# Patient Record
Sex: MALE | Race: WHITE | Employment: UNEMPLOYED | URBAN - METROPOLITAN AREA
[De-identification: names, ages, dates, MRNs, and addresses within clinical notes are randomized per-mention and may not be internally consistent; named-entity substitution may affect disease eponyms.]

---

## 2018-04-26 ENCOUNTER — HOSPITAL ENCOUNTER (INPATIENT)
Age: 68
LOS: 1 days | Discharge: HOME | DRG: 897 | End: 2018-04-27
Attending: EMERGENCY MEDICINE | Admitting: PSYCHIATRY & NEUROLOGY
Payer: OTHER GOVERNMENT

## 2018-04-26 DIAGNOSIS — R45.851 SUICIDAL IDEATION: Primary | ICD-10-CM

## 2018-04-26 PROBLEM — F19.94 SUBSTANCE INDUCED MOOD DISORDER (HCC): Status: ACTIVE | Noted: 2018-04-26

## 2018-04-26 LAB
ALBUMIN SERPL-MCNC: 4.2 G/DL (ref 3.5–5)
ALBUMIN/GLOB SERPL: 1.1 {RATIO} (ref 1.1–2.2)
ALP SERPL-CCNC: 90 U/L (ref 45–117)
ALT SERPL-CCNC: 28 U/L (ref 12–78)
AMPHET UR QL SCN: NEGATIVE
ANION GAP SERPL CALC-SCNC: 9 MMOL/L (ref 5–15)
APAP SERPL-MCNC: <2 UG/ML (ref 10–30)
APPEARANCE UR: CLEAR
AST SERPL-CCNC: 20 U/L (ref 15–37)
BACTERIA URNS QL MICRO: NEGATIVE /HPF
BARBITURATES UR QL SCN: NEGATIVE
BASOPHILS # BLD: 0 K/UL (ref 0–0.1)
BASOPHILS NFR BLD: 1 % (ref 0–1)
BENZODIAZ UR QL: NEGATIVE
BILIRUB SERPL-MCNC: 0.5 MG/DL (ref 0.2–1)
BILIRUB UR QL: NEGATIVE
BUN SERPL-MCNC: 12 MG/DL (ref 6–20)
BUN/CREAT SERPL: 12 (ref 12–20)
CALCIUM SERPL-MCNC: 9.5 MG/DL (ref 8.5–10.1)
CANNABINOIDS UR QL SCN: POSITIVE
CHLORIDE SERPL-SCNC: 108 MMOL/L (ref 97–108)
CO2 SERPL-SCNC: 23 MMOL/L (ref 21–32)
COCAINE UR QL SCN: NEGATIVE
COLOR UR: NORMAL
CREAT SERPL-MCNC: 0.99 MG/DL (ref 0.7–1.3)
DIFFERENTIAL METHOD BLD: ABNORMAL
DRUG SCRN COMMENT,DRGCM: ABNORMAL
EOSINOPHIL # BLD: 0.1 K/UL (ref 0–0.4)
EOSINOPHIL NFR BLD: 1 % (ref 0–7)
EPITH CASTS URNS QL MICRO: NORMAL /LPF
ERYTHROCYTE [DISTWIDTH] IN BLOOD BY AUTOMATED COUNT: 12.9 % (ref 11.5–14.5)
ETHANOL SERPL-MCNC: <10 MG/DL
GLOBULIN SER CALC-MCNC: 4 G/DL (ref 2–4)
GLUCOSE SERPL-MCNC: 100 MG/DL (ref 65–100)
GLUCOSE UR STRIP.AUTO-MCNC: NEGATIVE MG/DL
HCT VFR BLD AUTO: 44 % (ref 36.6–50.3)
HGB BLD-MCNC: 14.7 G/DL (ref 12.1–17)
HGB UR QL STRIP: NEGATIVE
IMM GRANULOCYTES # BLD: 0 K/UL (ref 0–0.04)
IMM GRANULOCYTES NFR BLD AUTO: 0 % (ref 0–0.5)
KETONES UR QL STRIP.AUTO: NEGATIVE MG/DL
LEUKOCYTE ESTERASE UR QL STRIP.AUTO: NEGATIVE
LYMPHOCYTES # BLD: 0.9 K/UL (ref 0.8–3.5)
LYMPHOCYTES NFR BLD: 14 % (ref 12–49)
MCH RBC QN AUTO: 33.6 PG (ref 26–34)
MCHC RBC AUTO-ENTMCNC: 33.4 G/DL (ref 30–36.5)
MCV RBC AUTO: 100.5 FL (ref 80–99)
METHADONE UR QL: NEGATIVE
MONOCYTES # BLD: 0.9 K/UL (ref 0–1)
MONOCYTES NFR BLD: 13 % (ref 5–13)
NEUTS SEG # BLD: 4.7 K/UL (ref 1.8–8)
NEUTS SEG NFR BLD: 71 % (ref 32–75)
NITRITE UR QL STRIP.AUTO: NEGATIVE
NRBC # BLD: 0 K/UL (ref 0–0.01)
NRBC BLD-RTO: 0 PER 100 WBC
OPIATES UR QL: NEGATIVE
PCP UR QL: NEGATIVE
PH UR STRIP: 6 [PH] (ref 5–8)
PLATELET # BLD AUTO: 242 K/UL (ref 150–400)
PMV BLD AUTO: 10.8 FL (ref 8.9–12.9)
POTASSIUM SERPL-SCNC: 3.8 MMOL/L (ref 3.5–5.1)
PROT SERPL-MCNC: 8.2 G/DL (ref 6.4–8.2)
PROT UR STRIP-MCNC: NEGATIVE MG/DL
RBC # BLD AUTO: 4.38 M/UL (ref 4.1–5.7)
RBC #/AREA URNS HPF: NORMAL /HPF (ref 0–5)
SALICYLATES SERPL-MCNC: 2.9 MG/DL (ref 2.8–20)
SODIUM SERPL-SCNC: 140 MMOL/L (ref 136–145)
SP GR UR REFRACTOMETRY: 1.01 (ref 1–1.03)
UR CULT HOLD, URHOLD: NORMAL
UROBILINOGEN UR QL STRIP.AUTO: 1 EU/DL (ref 0.2–1)
WBC # BLD AUTO: 6.6 K/UL (ref 4.1–11.1)
WBC URNS QL MICRO: NORMAL /HPF (ref 0–4)

## 2018-04-26 PROCEDURE — 36415 COLL VENOUS BLD VENIPUNCTURE: CPT | Performed by: EMERGENCY MEDICINE

## 2018-04-26 PROCEDURE — 80307 DRUG TEST PRSMV CHEM ANLYZR: CPT | Performed by: EMERGENCY MEDICINE

## 2018-04-26 PROCEDURE — 85025 COMPLETE CBC W/AUTO DIFF WBC: CPT | Performed by: EMERGENCY MEDICINE

## 2018-04-26 PROCEDURE — 65220000003 HC RM SEMIPRIVATE PSYCH

## 2018-04-26 PROCEDURE — 81001 URINALYSIS AUTO W/SCOPE: CPT | Performed by: EMERGENCY MEDICINE

## 2018-04-26 PROCEDURE — 99285 EMERGENCY DEPT VISIT HI MDM: CPT

## 2018-04-26 PROCEDURE — 74011250637 HC RX REV CODE- 250/637: Performed by: PSYCHIATRY & NEUROLOGY

## 2018-04-26 PROCEDURE — 80053 COMPREHEN METABOLIC PANEL: CPT | Performed by: EMERGENCY MEDICINE

## 2018-04-26 PROCEDURE — 90791 PSYCH DIAGNOSTIC EVALUATION: CPT

## 2018-04-26 PROCEDURE — 84443 ASSAY THYROID STIM HORMONE: CPT | Performed by: PSYCHIATRY & NEUROLOGY

## 2018-04-26 RX ORDER — BENZTROPINE MESYLATE 1 MG/ML
1 INJECTION INTRAMUSCULAR; INTRAVENOUS
Status: DISCONTINUED | OUTPATIENT
Start: 2018-04-26 | End: 2018-04-27 | Stop reason: HOSPADM

## 2018-04-26 RX ORDER — OLANZAPINE 2.5 MG/1
2.5 TABLET ORAL
Status: DISCONTINUED | OUTPATIENT
Start: 2018-04-26 | End: 2018-04-27 | Stop reason: HOSPADM

## 2018-04-26 RX ORDER — IBUPROFEN 400 MG/1
400 TABLET ORAL
Status: DISCONTINUED | OUTPATIENT
Start: 2018-04-26 | End: 2018-04-27 | Stop reason: HOSPADM

## 2018-04-26 RX ORDER — ADHESIVE BANDAGE
30 BANDAGE TOPICAL DAILY PRN
Status: DISCONTINUED | OUTPATIENT
Start: 2018-04-26 | End: 2018-04-27 | Stop reason: HOSPADM

## 2018-04-26 RX ORDER — ACETAMINOPHEN 325 MG/1
650 TABLET ORAL
Status: DISCONTINUED | OUTPATIENT
Start: 2018-04-26 | End: 2018-04-27 | Stop reason: HOSPADM

## 2018-04-26 RX ORDER — BENZTROPINE MESYLATE 1 MG/1
1 TABLET ORAL
Status: DISCONTINUED | OUTPATIENT
Start: 2018-04-26 | End: 2018-04-27 | Stop reason: HOSPADM

## 2018-04-26 RX ORDER — IBUPROFEN 200 MG
1 TABLET ORAL
Status: DISCONTINUED | OUTPATIENT
Start: 2018-04-26 | End: 2018-04-27 | Stop reason: HOSPADM

## 2018-04-26 RX ORDER — ZOLPIDEM TARTRATE 5 MG/1
5 TABLET ORAL
Status: DISCONTINUED | OUTPATIENT
Start: 2018-04-26 | End: 2018-04-27 | Stop reason: HOSPADM

## 2018-04-26 RX ADMIN — ZOLPIDEM TARTRATE 5 MG: 5 TABLET ORAL at 21:49

## 2018-04-26 NOTE — PROGRESS NOTES
Admission Medication Reconciliation:    Information obtained from: patient     Significant PMH/Disease States:   Past Medical History:   Diagnosis Date    Aggressive outburst     Liver disease     Psychiatric disorder     ptsd    Substance abuse     Withdrawal syndrome ([facility] [address]        Chief Complaint for this Admission:  behavioral health evaluation     Allergies:  Review of patient's allergies indicates no known allergies. Prior to Admission Medications:   None         Comments/Recommendations: PTA medication list was last reviewed in 2013. Patient denies regularly taking any prescription or non-prescription medications prior to admission. I removed: chlorhexidine liquid, clonazepam, divalproex ER, fluoxetine, MVI, olanzapine, and trazodone.

## 2018-04-26 NOTE — BSMART NOTE
Comprehensive Assessment Form Part 1      Section I - Disposition    Axis I - Depression Unspecified, Polysubstance Use Disorder (THC, cocaine, marijuana, heroin)   Axis II - Deferred  Axis III -   Past Medical History:   Diagnosis Date    Aggressive outburst     Liver disease     Psychiatric disorder     ptsd    Substance abuse     Withdrawal syndrome ([facility] [address]        Axis IV - Loss of significant other to suicide  [place] V - 28      The Medical Doctor to Psychiatrist conference was not completed. The Medical Doctor is in agreement with Psychiatrist disposition because of (reason) Admission is recommended. The plan is admit to general unit at [facility]. The on-call Psychiatrist consulted was Dr. Renny Lakhani. The admitting Psychiatrist will be Dr. Harry Bullock. The admitting Diagnosis is Depression and Substance Abuse. The Payor source is [place]. Section II - Integrated Summary  Summary:  Patient came in by squad due to suicidal ideation with a plan to obtain and use a large quantity of heroin to overdose. Patient reported he doesn't have it but can get the heroin. Patient indicated this is over a former girlfriend who committed suicide \"over me\". Patient currently goes to the [place] for services and sees a Dr Christopher Mcneill (sp?)  Patient reported he is on Prozac and stated its not working and he also stated he doesn't take it when he is drinking. Patient has been admitted previously for psychiatric and substance abuse treatment and most recently was at the [place] 4 months ago. Patient was also admitted here briefly in 2013 for a day and asked to leave. Patient is alert and oriented. He is somewhat guarded but answered most questions. Patient reported depression, loss of weight and appetite, poor sleep and SI to overdose. He reported he attempted suicide a couple of months ago but doesn't recall how. Patient reported he doesn't know how often he drinks and cannot recall the last time.   He reported use of IV heroin, cocaine, and THC a couple of days ago. Patient agrees to admission if it is recommended. He does not feel safe to discharge. Later patient stated he wasn't suicidal and made it up because he used all of his money. Spoke with ER Doctor Nithya De Souza and Dr Melissa Aragon, Psychiatry who still recommend admission as patient has no support system to safety plan with and has access to variety of substances. Discussed with patient and he agreed but was advised that if he continues to change his mind back and forth we definitely would call [facility] for assessment. Also advised him that voluntary admission means agreeing to accept treatment recommendations. Patient verbalized understanding. The patienthas demonstrated mental capacity to provide informed consent. The information is given by the patient and past medical records. The Chief Complaint is SI with plan to overdose. The Precipitant Factors are death by suicide of girlfriend in 2009. Previous Hospitalizations: Yes  Current Psychiatrist and/or  is [facility].    Lethality Assessment:    The potential for suicide noted by the following: defined plan, ideation and current substance abuse . The potential for homicide is not noted. The patient has not been a perpetrator of sexual or physical abuse. There are not pending charges. The patient is felt to be at risk for self harm or harm to others. The attending nurse was advised that security has not been notified. Section III - Psychosocial  The patient's overall mood and attitude is depressed. Feelings of helplessness and hopelessness are observed by verbal statements. Generalized anxiety is not observed. Panic is not observed. Phobias are not observed. Obsessive compulsive tendencies are not observed. Section IV - Mental Status Exam  The patient's appearance shows no evidence of impairment. The patient's behavior shows no evidence of impairment. The patient is oriented to time, place, person and situation.   The patient's speech shows no evidence of impairment. The patient's mood is depressed. The range of affect is constricted. The patient's thought content demonstrates no evidence of impairment. The thought process shows no evidence of impairment. The patient's perception shows no evidence of impairment. The patient's memory shows no evidence of impairment. The patient's appetite is decreased and shows signs of weight loss. The patient's sleep has evidence of insomnia. The patient shows no insight. The patient's judgement is psychologically impaired. Section V - Substance Abuse  The patient is using substances. The patient is using alcohol  with last use on unknown, cannabis by inhalation for   with last use on a few days ago, cocaine  with last use on a few days ago and heroin IV   with last use on a few days ago. The patient has experienced the following withdrawal symptoms: N/A. Section VI - Living Arrangements  The patient is single. The patient lives alone. The patient has one child he has no contact with. The patient does plan to return home upon discharge. The patient does not have legal issues pending. The patient's source of income comes from disability. Yazdanism and cultural practices have not been voiced at this time. The patient's greatest support comes from none and this person will not be involved with the treatment. The patient has not been in an event described as horrible or outside the realm of ordinary life experience either currently or in the past.  The patient has not been a victim of sexual/physical abuse. Section VII - Other Areas of Clinical Concern  The highest grade achieved is NA with the overall quality of school experience being described as NA. The patient is currently disabled and speaks [place] as a primary language. The patient has no communication impairments affecting communication.  The patient's preference for learning can be described as: can read and write adequately. The patient's hearing is normal.  The patient's vision is impaired and  wears glasses or contacts.       Maciel Baez, RUFINA

## 2018-04-26 NOTE — IP AVS SNAPSHOT
[address] [phone] Patient: Ron Alvares MRN: [id number] OSQ:87/43/0629 A check emiliano indicates which time of day the medication should be taken. My Medications Notice You have not been prescribed any medications.

## 2018-04-26 NOTE — IP AVS SNAPSHOT
[address] 
[phone] Patient: Desiree Workman MRN: [id number] URC: About your hospitalization You were admitted on:  2018 You last received care in the:  [address] You were discharged on:  2018 Why you were hospitalized Your primary diagnosis was:  Substance Induced Mood Disorder (Hcc) Follow-up Information Follow up With Details Comments Contact Info Provider Unknown   Patient not available to ask Dr Leah Sagastume  On 2018 Time of Your Appt: 11:30 am Lissa Bateman [facility] [address] [facility] [address] 
[phone] Discharge Orders None A check emiliano indicates which time of day the medication should be taken. My Medications Notice You have not been prescribed any medications. Discharge Instructions DISCHARGE SUMMARY 
 
[address] RODRIGUEZB: 1950 MRN: [id number] The patient Desiree Workman exhibits the ability to control behavior in a less restrictive environment. Patient's level of functioning is improving. No assaultive/destructive behavior has been observed for the past 24 hours. No suicidal/homicidal threat or behavior has been observed for the past 24 hours. There is no evidence of serious medication side effects. Patient has not been in physical or protective restraints for at least the past 24 hours. If weapons involved, how are they secured? n/a Is patient aware of and in agreement with discharge plan? yes Arrangements for medication:  Prescriptions were not given  to patient. Referral for substance abuse treatment ? Yes, [facility] Referral for smoking cessation needed ? no 
 
Copy of discharge instructions to  provider?:  Fax: [phone] Arrangements for transportation home:  Hospital provided transportation via [facility] Keep all follow up appointments as scheduled, continue to take prescribed medications per physician instructions. Mental health crisis number:  730 or your local mental health crisis line number at [phone] DISCHARGE SUMMARY from Nurse PATIENT INSTRUCTIONS: 
 
What to do at Home: 
Recommended activity: Activity as tolerated, If you experience any of the following symptoms thoughts of harming self, feeling overwhelmed with grief, please follow up with assigned providers and local crisis number. *  Please give a list of your current medications to your Primary Care Provider. *  Please update this list whenever your medications are discontinued, doses are 
    changed, or new medications (including over-the-counter products) are added. *  Please carry medication information at all times in case of emergency situations. These are general instructions for a healthy lifestyle: No smoking/ No tobacco products/ Avoid exposure to second hand smoke Surgeon General's Warning:  Quitting smoking now greatly reduces serious risk to your health. Obesity, smoking, and sedentary lifestyle greatly increases your risk for illness A healthy diet, regular physical exercise & weight monitoring are important for maintaining a healthy lifestyle You may be retaining fluid if you have a history of heart failure or if you experience any of the following symptoms:  Weight gain of 3 pounds or more overnight or 5 pounds in a week, increased swelling in our hands or feet or shortness of breath while lying flat in bed. Please call your doctor as soon as you notice any of these symptoms; do not wait until your next office visit. Recognize signs and symptoms of STROKE: 
 
F-face looks uneven A-arms unable to move or move unevenly S-speech slurred or non-existent T-time-call 911 as soon as signs and symptoms begin-DO NOT go  
 Back to bed or wait to see if you get better-TIME IS BRAIN. Warning Signs of HEART ATTACK Call 911 if you have these symptoms: 
? Chest discomfort. Most heart attacks involve discomfort in the center of the chest that lasts more than a few minutes, or that goes away and comes back. It can feel like uncomfortable pressure, squeezing, fullness, or pain. ? Discomfort in other areas of the upper body. Symptoms can include pain or discomfort in one or both arms, the back, neck, jaw, or stomach. ? Shortness of breath with or without chest discomfort. ? Other signs may include breaking out in a cold sweat, nausea, or lightheadedness. Don't wait more than five minutes to call [address]! Fast action can save your life. Calling 911 is almost always the fastest way to get lifesaving treatment. Emergency Medical Services staff can begin treatment when they arrive  up to an hour sooner than if someone gets to the hospital by car. The discharge information has been reviewed with the patient. The patient verbalized understanding. Discharge medications reviewed with the patient and appropriate educational materials and side effects teaching were provided. ___________________________________________________________________________________________________________________________________ Ecovision Announcement We are excited to announce that we are making your provider's discharge notes available to you in Ecovision. You will see these notes when they are completed and signed by the physician that discharged you from your recent hospital stay. If you have any questions or concerns about any information you see in [employer], please call the Health Information Department where you were seen or reach out to your Primary Care Provider for more information about your plan of care. Introducing [facility]!    
 [facility] introduces Ecovision patient portal. Now you can access parts of your medical record, email your doctor's office, and request medication refills online. 1. In your internet browser, go to [email or web address] "[email or web address] 2. Click on the First Time User? Click Here link in the Sign In box. You will see the New Member Sign Up page. 3. Enter your Breather Access Code exactly as it appears below. You will not need to use this code after youve completed the sign-up process. If you do not sign up before the expiration date, you must request a new code. · Breather Access Code: [email or web address] Expires: 7/25/2018  9:45 AM 
 
4. Enter the last four digits of your Social Security Number (xxxx) and Date of Birth (mm/dd/yyyy) as indicated and click Submit. You will be taken to the next sign-up page. 5. Create a Breather ID. This will be your Breather login ID and cannot be changed, so think of one that is secure and easy to remember. 6. Create a Breather password. You can change your password at any time. 7. Enter your Password Reset Question and Answer. This can be used at a later time if you forget your password. 8. Enter your e-mail address. You will receive e-mail notification when new information is available in [address] 9. Click Sign Up. You can now view and download portions of your medical record. 10. Click the Download Summary menu link to download a portable copy of your medical information. If you have questions, please visit the Frequently Asked Questions section of the Breather website. Remember, Breather is NOT to be used for urgent needs. For medical emergencies, dial 911. Now available from your iPhone and Android! Introducing Rahul Ferrera As a Ravindra Peña patient, I wanted to make you aware of our electronic visit tool called Rahul Ferrera. Ravindra Pompa 24/7 allows you to connect within minutes with a medical provider 24 hours a day, seven days a week via a mobile device or tablet or logging into a secure website from your computer. You can access [employer] from anywhere in the [place]. A virtual visit might be right for you when you have a simple condition and feel like you just dont want to get out of bed, or cant get away from work for an appointment, when your regular [employer] provider is not available (evenings, weekends or holidays), or when youre out of town and need minor care. Electronic visits cost only $49 and if the [employer] 24/7 provider determines a prescription is needed to treat your condition, one can be electronically transmitted to a nearby pharmacy*. Please take a moment to enroll today if you have not already done so. The enrollment process is free and takes just a few minutes. To enroll, please download the [employer] 24/7 maddy to your tablet or phone, or visit [email or web address] to enroll on your computer. And, as an [address] [place] [address] patient with a [email or web address] account, the results of your visits will be scanned into your electronic medical record and your primary care provider will be able to view the scanned results. We urge you to continue to see your regular [employer] provider for your ongoing medical care. And while your primary care provider may not be the one available when you seek a CanWeNetwork virtual visit, the peace of mind you get from getting a real diagnosis real time can be priceless. For more information on CanWeNetwork, view our Frequently Asked Questions (FAQs) at [email or web address]. Sincerely, 
 
Kathryn Meredith MD 
Chief Medical Officer [facility] Lisa Guerrero *:  certain medications cannot be prescribed via CanWeNetwork Providers Seen During Your Hospitalization Provider Specialty Primary office phone Jailyn Leon MD Emergency Medicine [phone] Gabrielle Campos MD Psychiatry [phone] Anibal Brantley MD Psychiatry [phone] Your Primary Care Physician (PCP) Primary Care Physician Office Phone Office Fax UNKNOWN, PROVIDER ** None ** ** None ** You are allergic to the following No active allergies Recent Documentation Height Weight BMI Smoking Status 1.803 m 72.6 kg 22.32 kg/m2 Former Smoker Emergency Contacts Name Discharge Info Relation Home Work Mobile Morales,Charlotte DISCHARGE CAREGIVER [3] Other Relative [6] [phone] Patient Belongings The following personal items are in your possession at time of discharge: 
  Dental Appliances: None  Visual Aid: None      Home Medications: None   Jewelry: Watch (with pt)  Clothing: Belt, Footwear, Pants, Shirt    Other Valuables: Cigarettes, Personal toiletries (cigarettes,comb)  Personal Items Sent to Safe: None Please provide this summary of care documentation to your next provider. Signatures-by signing, you are acknowledging that this After Visit Summary has been reviewed with you and you have received a copy. Patient Signature:  ____________________________________________________________ Date:  ____________________________________________________________  
  
Penny Finger Provider Signature:  ____________________________________________________________ Date:  ____________________________________________________________

## 2018-04-26 NOTE — ROUTINE PROCESS
TRANSFER - OUT REPORT:    Verbal report given to Dominic Workman RN(name) on Beauty Dia  being transferred to 727(unit) for routine progression of care       Report consisted of patients Situation, Background, Assessment and   Recommendations(SBAR). Information from the following report(s) SBAR, ED Summary and Recent Results was reviewed with the receiving nurse. Lines:       Opportunity for questions and clarification was provided.       Patient transported with:  [employer]

## 2018-04-26 NOTE — BH NOTES
At 1435 patient arrived to the unit. Primary Nurse Tristin Ford RN and Liz Pizano RN performed a dual skin assessment on this patient No impairment noted  David score is 23    Oriented to the unit. Denies SI. States is depressed. States does not drink alcohol. States smokes THC yesterday. States is [place]. States is sad over a friend who \"comitted suicide because of me\". Patient mood is labile. Patient stated he lives in apartment and also said that he was wanting a place to stay until his check came in but that he was never really suicidal. Pt does agree to voluntarily stay and would like to seek help for his depression. Patient states I do not want any meds\" NKA per pt           At [phone] paged Dr. Jose Alfredo Contreras for orders.

## 2018-04-26 NOTE — ED PROVIDER NOTES
HPI Comments: 79 y.o. male with past medical history significant for withdrawal syndrome, substance abuse, liver disease, PTSD, and aggressive outburst who presents from home via EMS with chief complaint of SI. Pt reports that he has had psychiatric issues since his girlfriend committed suicide \"over him\" 20+ years ago \"in the 80's. \" Pt reports monthly thoughts of SI with the plan to OD on heroin. Pt reports access to the drug and that he last used \"a couple of weeks ago. \" Pt states that he is followed by a psychiatrist and was last seen \"a couple months ago. \" Pt endorses that he was last admitted for pyschiatric care ~ 4 months ago to the [facility] where he endorses staying for [place] couple weeks. \" Pt denies being on any current medications. He states that he has never had any DT's or seizures when in withdrawal from alcohol. There are no other acute medical concerns at this time. Social hx: 1 pack / day smoker. Pt reports that he is an \"alcoholic\" with last drink ~ 1 week ago. Heroin use. Note written by Violetta García, as dictated by Alisha Leon MD 9:53 AM      The history is provided by the patient. No  was used. Past Medical History:   Diagnosis Date    Aggressive outburst     Liver disease     Psychiatric disorder     ptsd    Substance abuse     Withdrawal syndrome ([facility] [address]        History reviewed. No pertinent surgical history. Family History:   Problem Relation Age of Onset    Substance Abuse Father        Social History     Social History    Marital status: SINGLE     Spouse name: N/A    Number of children: N/A    Years of education: N/A     Occupational History    Not on file.      Social History Main Topics    Smoking status: Former Smoker    Smokeless tobacco: Not on file    Alcohol use 12.0 oz/week     24 Cans of beer per week    Drug use: Not on file    Sexual activity: Yes     Other Topics Concern    Not on file     Social History Narrative         ALLERGIES: Review of patient's allergies indicates no known allergies. Review of Systems   Constitutional: Negative for activity change, chills and fever. HENT: Negative for nosebleeds, sore throat, trouble swallowing and voice change. Eyes: Negative for visual disturbance. Respiratory: Negative for shortness of breath. Cardiovascular: Negative for chest pain and palpitations. Gastrointestinal: Negative for abdominal pain, constipation, diarrhea and nausea. Genitourinary: Negative for difficulty urinating, dysuria, hematuria and urgency. Musculoskeletal: Negative for back pain, neck pain and neck stiffness. Skin: Negative for color change. Allergic/Immunologic: Negative for immunocompromised state. Neurological: Negative for dizziness, seizures, syncope, weakness, light-headedness, numbness and headaches. Psychiatric/Behavioral: Positive for suicidal ideas. Negative for behavioral problems, confusion, hallucinations and self-injury. All other systems reviewed and are negative. Vitals:    04/26/18 0946   BP: 160/80   Pulse: 82   Resp: 14   Temp: 97.5 °F (36.4 °C)   SpO2: 98%   Weight: 72.6 kg (160 lb)   Height: 5' 11\" (1.803 m)            Physical Exam   Constitutional: He is oriented to person, place, and time. He appears well-developed and well-nourished. No distress. Disheveled and elderly appearance. HENT:   Head: Normocephalic and atraumatic. Eyes: Pupils are equal, round, and reactive to light. Neck: Normal range of motion. Neck supple. Cardiovascular: Normal rate, regular rhythm and normal heart sounds. Exam reveals no gallop and no friction rub. No murmur heard. Pulmonary/Chest: Effort normal and breath sounds normal. No respiratory distress. He has no wheezes. Abdominal: Soft. Bowel sounds are normal. He exhibits no distension. There is no tenderness. There is no rebound and no guarding. Musculoskeletal: Normal range of motion. Neurological: He is alert and oriented to person, place, and time. Skin: Skin is warm. No rash noted. He is not diaphoretic. Psychiatric: He has a normal mood and affect. His behavior is normal. Judgment normal. He expresses suicidal ideation. Nursing note and vitals reviewed. Note written by Violetta Randolph, as dictated by Melony Brown MD 9:55 AM      [facility]      ED Course     This is a 80-year-old male with past medical history, review of systems, physical exam as above, presenting with complaints of suicidal ideation, patient states is due to the loss of his girlfriend approximately 25 years ago. Patient denies auditory or visual hallucinations, dorsalis previous hospitalizations for suicidality, PTSD, substance abuse, tobacco use, alcohol use. Physical exam remarkable for disheveled elderly male in no acute distress. He states his method of suicide is planned to be heroin overdose, which she has access to. Plan to obtain preadmission labs, consult psychiatry for recommendations, and will be disposition based on the patient's diagnostics and response to therapy. Procedures      12:28 PM  Patient medically cleared, per psych will be admitted. 12:46 PM  Patient now denies SI, Psych recs consulting [address] [place] [address] which they are facilitating. 1:30 PM  Informed by [facility] that patient is willing to be admitted.

## 2018-04-26 NOTE — IP AVS SNAPSHOT
Summary of Care Report The Summary of Care report has been created to help improve care coordination. Users with access to [employer] or [address] (Web-based application) may access additional patient information including the Discharge Summary. If you are not currently a [address] user and need more information, please call the number listed below in the [place] [address] section and ask to be connected with Medical Records. Facility Information Name Address Phone [phone] [phone] Patient Information Patient Name Sex  Kole Rider ([id number]) Male 1950 Discharge Information Admitting Provider Service Area Unit Bree aCrcamo MD / [id number] / [phone] Discharge Provider Discharge Date/Time Discharge Disposition Destination (none) 2018 (Pending) AHR (none) Patient Language Language ENGLISH [13] Hospital Problems as of 2018  Reviewed: 2013  3:49 PM by Brianda Hayden NP Class Noted - Resolved Last Modified POA Active Problems * (Principal)Substance induced mood disorder ([address]  2018 - Present 2018 by Rosie Alexander MD Unknown Entered by Bree Carcamo MD  
  
Non-Hospital Problems as of 2018  Reviewed: 2013  3:49 PM by Brianda Hayden NP Class Noted - Resolved Last Modified Active Problems Mood disorder ([address]  2013 - Present 2013 Entered by Brianda Hayden NP Alcohol intoxication ([address]  2013 - Present 2013 Entered by Brianda Hayden NP Polysubstance dependence ([address]  2013 - Present 2013 Entered by Brianda Hayden NP You are allergic to the following No active allergies Current Discharge Medication List  
  
 Notice You have not been prescribed any medications. Follow-up Information Follow up With Details Comments Contact Info Provider Unknown   Patient not available to ask Dr Drew Nesbitt  On 2018 Time of Your Appt: 11:30 am Hodan Stringer [facility] [address] [address] 
[phone] Discharge Instructions DISCHARGE SUMMARY 
 
[address] [place]: 1950 MRN: [id number] The patient Gerald Belle exhibits the ability to control behavior in a less restrictive environment. Patient's level of functioning is improving. No assaultive/destructive behavior has been observed for the past 24 hours. No suicidal/homicidal threat or behavior has been observed for the past 24 hours. There is no evidence of serious medication side effects. Patient has not been in physical or protective restraints for at least the past 24 hours. If weapons involved, how are they secured? n/a Is patient aware of and in agreement with discharge plan? yes Arrangements for medication:  Prescriptions were not given  to patient. Ref[facility] for [facility] treatment ? [facility] Referral for smoking cessation needed ? no 
 
Copy of discharge instructions to  provider?:  Fax: [phone] Arrangements for transportation home:  Hospital provided transportation via [facility] Keep all follow up appointments as scheduled, continue to take prescribed medications per physician instructions. Mental health crisis number:  474 or your local mental health crisis line number at [phone] DISCHARGE SUMMARY from Nurse PATIENT INSTRUCTIONS: 
 
What to do at Home: 
Recommended activity: Activity as tolerated, If you experience any of the following symptoms thoughts of harming self, feeling overwhelmed with grief, please follow up with assigned providers and local crisis number. *  Please give a list of your current medications to your Primary Care Provider. *  Please update this list whenever your medications are discontinued, doses are 
    changed, or new medications (including over-the-counter products) are added. *  Please carry medication information at all times in case of emergency situations. These are general instructions for a healthy lifestyle: No smoking/ No tobacco products/ Avoid exposure to second hand smoke Surgeon General's Warning:  Quitting smoking now greatly reduces serious risk to your health. Obesity, smoking, and sedentary lifestyle greatly increases your risk for illness A healthy diet, regular physical exercise & weight monitoring are important for maintaining a healthy lifestyle You may be retaining fluid if you have a history of heart failure or if you experience any of the following symptoms:  Weight gain of 3 pounds or more overnight or 5 pounds in a week, increased swelling in our hands or feet or shortness of breath while lying flat in bed. Please call your doctor as soon as you notice any of these symptoms; do not wait until your next office visit. Recognize signs and symptoms of STROKE: 
 
F-face looks uneven A-arms unable to move or move unevenly S-speech slurred or non-existent T-time-call 911 as soon as signs and symptoms begin-DO NOT go Back to bed or wait to see if you get better-TIME IS BRAIN. Warning Signs of HEART ATTACK Call 911 if you have these symptoms: 
? Chest discomfort. Most heart attacks involve discomfort in the center of the chest that lasts more than a few minutes, or that goes away and comes back. It can feel like uncomfortable pressure, squeezing, fullness, or pain. ? Discomfort in other areas of the upper body. Symptoms can include pain or discomfort in one or both arms, the back, neck, jaw, or stomach. ? Shortness of breath with or without chest discomfort. ? Other signs may include breaking out in a cold sweat, nausea, or lightheadedness. Don't wait more than five minutes to call [address]! Fast action can save your life. Calling 911 is almost always the fastest way to get lifesaving treatment. Emergency Medical Services staff can begin treatment when they arrive  up to an hour sooner than if someone gets to the hospital by car. The discharge information has been reviewed with the patient. The patient verbalized understanding. Discharge medications reviewed with the patient and appropriate educational materials and side effects teaching were provided. ___________________________________________________________________________________________________________________________________ Chart Review Routing History No Routing History on File

## 2018-04-26 NOTE — PROGRESS NOTES
Problem: Psychosis  Goal: *STG: Remains safe in hospital  Outcome: Progressing Towards Goal  Patient is sitting in the dinning room. Alert and verbal.  Flat, sad affect. No distress noted.   Patient remains safe in hospital.

## 2018-04-26 NOTE — ED TRIAGE NOTES
Pt arrived by EMS from home. Pt complaint of SI since this morning. Pt states that he has a plan and means. Pt states that is how his girlfriend committed suicide in the 80's.

## 2018-04-27 VITALS
WEIGHT: 160 LBS | SYSTOLIC BLOOD PRESSURE: 129 MMHG | BODY MASS INDEX: 22.4 KG/M2 | RESPIRATION RATE: 16 BRPM | OXYGEN SATURATION: 98 % | HEART RATE: 74 BPM | TEMPERATURE: 98.3 F | HEIGHT: 71 IN | DIASTOLIC BLOOD PRESSURE: 87 MMHG

## 2018-04-27 LAB — TSH SERPL DL<=0.05 MIU/L-ACNC: 1.06 UIU/ML (ref 0.36–3.74)

## 2018-04-27 NOTE — BH NOTES
PSYCHOSOCIAL ASSESSMENT    Patient identifying info:  Dennys Poole is a 79 y.o., male admitted 4/26/2018  9:40 AM     Presenting problem and precipitating factors: Pt voluntarily admitted himself to the hospital due to suicidal ideation with plan to use large quantity of heroin to overdose. Pt told US that he embellished the truth in order to get admitted to the hospital. US asked why he needed to be in the hospital? Pt said\" I am depressed\" but not suicidal.  SW asked if using drugs played a part in his depression? Pt responded by saying,\" I smoke THC but I also lied about using heroin. \" Pt said I still feel sad that my girlfriend committed suicide. He claims he met her while they were patients in the hospital. According to pt, she was also mentally ill ( schizophrenic) and unstable. US asked what does he expect from this hospitalization and [facility]? [facility] said the [place] did have nay beds ( likes [facility] better) and wants to get his ds adjusted. Pt has been seeing Dr Jesús Cullen for eight years and he claims they have a good working relationship. Pt plans to move to [place] to visit girlfriend's  grave so that he can put her death behind him. US unsure if pt is just rambling  or serious about his grief. Mental status assessment:  Alert, no signs of any psychosis and capable of articulating his thoughts    Current psychiatric/substance abuse providers and contact info:  Dr Jesús Cullen @ [place] ( psych)     Previous psychiatric or substance abuse services/providers and response to treatment: [facility] SA / SI      Family history of substance abuse or mental illness:    Substance abuse history: Pt has history of poly-substance abuse: IV  heroin, alcohol, cocaine and marijuana. He is currently using heroin, thc, cocaine and alcohol. His UDS was however only positive for thc.    Social History   Substance Use Topics    Smoking status: Former Smoker    Smokeless tobacco: Never Used    Alcohol use No       History of biomedical complications associated with substance abuse:      Patient's current acceptance of treatment or motivation for change:    Family constellation:    Is significant other involved? No,      Describe support system: Pt stated he lacks a support system except [facility]    Describe living arrangements and home environment:  Pt is single, has one adult child ( no contact) and he lives alone. Health issues:   Hospital Problems  Date Reviewed: 2013          Codes Class Noted POA    Substance induced mood disorder [facility]) ICD-10-CM: F19.94  ICD-9-CM: 292.84  2018 Unknown              Trauma history: Pt told Team that he suffers guilt over his  girlfriend taking her life and he was the cause.     Legal issues:     History of  service: Yes , Christine ( [employer])     Financial status: Disability     Advent/cultural factors:  Not voiced    Education/work history:      Have you been licensed as a health care professional ( current or  ) :  No    Leisure and recreation preferences:    Describe coping skills:  Ineffective and poor judgement  Kaylin Lay  2018

## 2018-04-27 NOTE — DISCHARGE SUMMARY
PSYCHIATRIC DISCHARGE SUMMARY         IDENTIFICATION:    Patient Name  Chloé Carreon   Date of Birth 1950   [facility] [id number]   Medical Record Number  [id number]      Age  79 y.o. PCP PROVIDER UNKNOWN   Admit date:  4/26/2018    Discharge date: 4/27/2018   Room Number  727/01  @ [facility]   Date of Service  4/27/2018                 Discharge -REGULAR               CONDITION AT DISCHARGE: good       PROVISIONAL & DISCHARGE DIAGNOSES:    Problem List  Date Reviewed: 5/19/2013          Codes Class    * (Principal)Substance induced mood disorder ([facility] [address] ICD-10-CM: F19.94  ICD-9-CM: 292.84         Mood disorder ([facility] [address] ICD-10-CM: F39  ICD-9-CM: 296.90         Alcohol intoxication ([facility] [address] ICD-10-CM: F10.929  ICD-9-CM: 305.00         Polysubstance dependence ([facility] [address] ICD-10-CM: F19.20  ICD-9-CM: 304.80               Active Hospital Problems    *Substance induced mood disorder ([facility] [address]        DISCHARGE DIAGNOSIS:   Axis I:  SEE ABOVE  Axis II: SEE ABOVE  Axis III: SEE ABOVE  Axis IV:  lack of structure  Axis V:  70 on admission, 70 on discharge 70(baseline)       CC & HISTORY OF PRESENT ILLNESS:  79year old male admitted for depression and substance abuse. Pt denied SI/HI intent or plan and did not meet TDO criteria. SOCIAL HISTORY:    Social History     Social History    Marital status: SINGLE     Spouse name: N/A    Number of children: N/A    Years of education: N/A     Occupational History    Not on file. Social History Main Topics    Smoking status: Former Smoker    Smokeless tobacco: Never Used    Alcohol use No    Drug use: Yes     Special: Marijuana      Comment: a joint a week    Sexual activity: Yes     Other Topics Concern    Not on file     Social History Narrative    79year old male admitted for depression and substance abuse. Pt is followed at the [address]:   Family History   Problem Relation Age of Onset    Substance Abuse Father              HOSPITALIZATION COURSE:    Malcom Garcia was admitted to the inpatient psychiatric unit [facility] for acute psychiatric stabilization in regards to symptomatology as described in the HPI above. The differential diagnosis at time of admission included: depression While on the unit Malcom Garcia was involved in individual, group, [address] [place] [address] Avenue demonstrated a slow, but progressive improvement in overall condition. Much of patient's depression appeared to be related to situational stressors and psychological factors. Please see individual progress notes for more specific details regarding patient's hospitalization course. At time of dc, Malcom Garcia was without significant problems with depression psychosis  micha. Overall presentation at time of discharge is most consistent with the diagnosis of adjustment disorder with depressed mood. Patient with request for discharge today. There are no grounds to seek a TDO. Patient has maximized benefit to be derived from acute inpatient psychiatric treatment.   All members of the treatment team concur with each other in regards to plans for discharge today per patient's request.          LABS AND IMAGAING:    Labs Reviewed   CBC WITH AUTOMATED DIFF - Abnormal; Notable for the following:        Result Value    .5 (*)     All other components within normal limits   DRUG SCREEN, URINE - Abnormal; Notable for the following:     THC (TH-CANNABINOL) POSITIVE (*)     All other components within normal limits   ACETAMINOPHEN - Abnormal; Notable for the following:     Acetaminophen level <2 (*)     All other components within normal limits   URINE CULTURE HOLD SAMPLE   METABOLIC PANEL, COMPREHENSIVE   URINALYSIS W/MICROSCOPIC   SALICYLATE   ETHYL ALCOHOL   TSH 3RD GENERATION     Admission on 04/26/2018   Component Date Value Ref Range Status    WBC 04/26/2018 6.6  4.1 - 11.1 K/uL Final    RBC 04/26/2018 4.38  4.10 - 5.70 M/uL Final    HGB 04/26/2018 14.7  12.1 - 17.0 g/dL Final    HCT 04/26/2018 44.0  36.6 - 50.3 % Final    MCV 04/26/2018 100.5* 80.0 - 99.0 FL Final    MCH 04/26/2018 33.6  26.0 - 34.0 PG Final    MCHC 04/26/2018 33.4  30.0 - 36.5 g/dL Final    RDW 04/26/2018 12.9  11.5 - 14.5 % Final    PLATELET 49/80/8977 204  150 - 400 K/uL Final    MPV 04/26/2018 10.8  8.9 - 12.9 FL Final    NRBC 04/26/2018 0.0  0  WBC Final    ABSOLUTE NRBC 04/26/2018 0.00  0.00 - 0.01 K/uL Final    NEUTROPHILS 04/26/2018 71  32 - 75 % Final    LYMPHOCYTES 04/26/2018 14  12 - 49 % Final    MONOCYTES 04/26/2018 13  5 - 13 % Final    EOSINOPHILS 04/26/2018 1  0 - 7 % Final    BASOPHILS 04/26/2018 1  0 - 1 % Final    IMMATURE GRANULOCYTES 04/26/2018 0  0.0 - 0.5 % Final    ABS. NEUTROPHILS 04/26/2018 4.7  1.8 - 8.0 K/UL Final    ABS. LYMPHOCYTES 04/26/2018 0.9  0.8 - 3.5 K/UL Final    ABS. MONOCYTES 04/26/2018 0.9  0.0 - 1.0 K/UL Final    ABS. EOSINOPHILS 04/26/2018 0.1  0.0 - 0.4 K/UL Final    ABS. BASOPHILS 04/26/2018 0.0  0.0 - 0.1 K/UL Final    ABS. IMM. GRANS. 04/26/2018 0.0  0.00 - 0.04 K/UL Final    DF 04/26/2018 AUTOMATED    Final    Sodium 04/26/2018 140  136 - 145 mmol/L Final    Potassium 04/26/2018 3.8  3.5 - 5.1 mmol/L Final    Chloride 04/26/2018 108  97 - 108 mmol/L Final    CO2 04/26/2018 23  21 - 32 mmol/L Final    Anion gap 04/26/2018 9  5 - 15 mmol/L Final    Glucose 04/26/2018 100  65 - 100 mg/dL Final    BUN 04/26/2018 12  6 - 20 MG/DL Final    Creatinine 04/26/2018 0.99  0.70 - 1.30 MG/DL Final    BUN/Creatinine ratio 04/26/2018 12  12 - 20   Final    GFR est AA 04/26/2018 >60  >60 ml/min/1.73m2 Final    GFR est non-AA 04/26/2018 >60  >60 ml/min/1.73m2 Final    Calcium 04/26/2018 9.5  8.5 - 10.1 MG/DL Final    Bilirubin, total 04/26/2018 0.5  0.2 - 1.0 MG/DL Final    ALT (SGPT) 04/26/2018 28  12 - 78 U/L Final    AST (SGOT) 04/26/2018 20  15 - 37 U/L Final    Alk.  phosphatase 04/26/2018 90  45 - 117 U/L Final  Protein, total 04/26/2018 8.2  6.4 - 8.2 g/dL Final    Albumin 04/26/2018 4.2  3.5 - 5.0 g/dL Final    Globulin 04/26/2018 4.0  2.0 - 4.0 g/dL Final    A-G Ratio 04/26/2018 1.1  1.1 - 2.2   Final    Color 04/26/2018 YELLOW/STRAW    Final    Appearance 04/26/2018 CLEAR  CLEAR   Final    Specific gravity 04/26/2018 1.008  1.003 - 1.030   Final    pH (UA) 04/26/2018 6.0  5.0 - 8.0   Final    Protein 04/26/2018 NEGATIVE   NEG mg/dL Final    Glucose 04/26/2018 NEGATIVE   NEG mg/dL Final    Ketone 04/26/2018 NEGATIVE   NEG mg/dL Final    Bilirubin 04/26/2018 NEGATIVE   NEG   Final    Blood 04/26/2018 NEGATIVE   NEG   Final    Urobilinogen 04/26/2018 1.0  0.2 - 1.0 EU/dL Final    Nitrites 04/26/2018 NEGATIVE   NEG   Final    Leukocyte Esterase 04/26/2018 NEGATIVE   NEG   Final    WBC 04/26/2018 0-4  0 - 4 /hpf Final    RBC 04/26/2018 0-5  0 - 5 /hpf Final    Epithelial cells 04/26/2018 FEW  FEW /lpf Final    Bacteria 04/26/2018 NEGATIVE   NEG /hpf Final    Urine culture hold 04/26/2018 URINE ON HOLD IN MICROBIOLOGY DEPT FOR 3 DAYS. IF UNPRESERVED URINE IS SUBMITTED, IT CANNOT BE USED FOR ADDITIONAL TESTING AFTER 24 HRS, RECOLLECTION WILL BE REQUIRED. Final    AMPHETAMINES 04/26/2018 NEGATIVE   NEG   Final    BARBITURATES 04/26/2018 NEGATIVE   NEG   Final    BENZODIAZEPINES 04/26/2018 NEGATIVE   NEG   Final    COCAINE 04/26/2018 NEGATIVE   NEG   Final    METHADONE 04/26/2018 NEGATIVE   NEG   Final    OPIATES 04/26/2018 NEGATIVE   NEG   Final    PCP(PHENCYCLIDINE) 04/26/2018 NEGATIVE   NEG   Final    THC (TH-CANNABINOL) 04/26/2018 POSITIVE* NEG   Final    Drug screen comment 04/26/2018 (NOTE)   Final    Acetaminophen level 04/26/2018 <2* 10 - 30 ug/mL Final    Salicylate level 44/79/6400 2.9  2.8 - 20.0 MG/DL Final    ALCOHOL(ETHYL),SERUM 04/26/2018 <10  <10 MG/DL Final    TSH 04/26/2018 1.06  0.36 - 3.74 uIU/mL Final     No results found. DISPOSITION:    Home.  Patient to f/u with o/p drug/etoh rehabilitation, psychiatric, and psychotherapy appointments. Patient is to f/u with internist as directed. Patient should have a depakote level and associated labs checked within the next 1-2 weeks by patient's o/p psychiatrist/internist.               FOLLOW-UP CARE:      Regular Diet  Wound Care: none needed. Follow-up Information     Follow up With Details Comments Contact Info    Provider Unknown   Patient not available to ask                   PROGNOSIS:  Greatly dependent upon patient's ability to remain sober and to f/u with o/p drug/etoh rehabilitation and psychiatric/psychotherapy appointments as well as to comply with psychiatric medications as prescribed. Patient denies suicidal or homicidal ideations. Usha Owingo fully contracts for safety. Patient reports many positive predictive factors in terms of not attempting suicide or homicide. Patient appears to be at low risk of suicide or homicide. Patient and family are aware and in agreement with discharge and discharge plan. DISCHARGE MEDICATIONS: no changes made). Informed consent given for the use of following psychotropic medications: There are no discharge medications for this patient. A coordinated, multidisplinary treatment team round was conducted with Claude Vaca---this is done daily here at [facility] . This team consists of the nurse, psychiatric unit pharmcist,  and writer. I have spent greater than 35 minutes on discharge work.     Signed:  Chito Kolb MD  4/27/2018

## 2018-04-27 NOTE — H&P
INITIAL PSYCHIATRIC EVALUATION            IDENTIFICATION:    Patient Name  Chari Verma   Date of Birth 1950   [facility] [id number]   Medical Record Number  [id number]      Age  79 y.o. PCP PROVIDER UNKNOWN   Admit date:  4/26/2018    Room Number  727/01  @ [facility]   Date of Service  4/27/2018            HISTORY         REASON FOR HOSPITALIZATION:  CC: \"depression \". Pt admitted under a voluntary basis for severe depression with suicidal ideations no intent and and an inability to care for self. HISTORY OF PRESENT ILLNESS:    The patient, Chari Verma, is a 79 y.o. WHITE [place]  male with a past psychiatric history significant for substance abuse, who presents at this time with complaints of (and/or evidence of) the following emotional symptoms: depression. Additional symptomatology include anxiety. The above symptoms have been present for years. These symptoms are of severe severity. These symptoms are intermittent/ fleeting in nature. The patient's condition has been precipitated by subxtance abuse and psychosocial stressors (non compliance  ). Patient's condition made worse by continued illicit drug use and alcohol use as well as treatment noncompliance. UDS: +MJ ; BAL=0. ALLERGIES: No Known Allergies   MEDICATIONS PRIOR TO ADMISSION:   No prescriptions prior to admission. PAST MEDICAL HISTORY:   Past Medical History:   Diagnosis Date    Aggressive outburst     Liver disease     Psychiatric disorder     ptsd    Substance abuse     Withdrawal syndrome ([address]    History reviewed. No pertinent surgical history. SOCIAL HISTORY:    Social History     Social History    Marital status: SINGLE     Spouse name: N/A    Number of children: N/A    Years of education: N/A     Occupational History    Not on file.      Social History Main Topics    Smoking status: Former Smoker    Smokeless tobacco: Never Used    Alcohol use No    Drug use: Yes     Special: Marijuana      Comment: a joint a week    Sexual activity: Yes     Other Topics Concern    Not on file     Social History Narrative    79year old male admitted for depression and substance abuse. Pt is followed at the [address]: . Family History   Problem Relation Age of Onset    Substance Abuse Father        REVIEW OF SYSTEMS:   General ROS: positive for BEHAVIORAL DIORDER  Respiratory ROS: no cough, shortness of breath, or wheezing  Cardiovascular ROS: no chest pain or dyspnea on exertion  Pertinent items are noted in the History of Present Illness. All other Systems reviewed and are considered negative. MENTAL STATUS EXAM & VITALS     MENTAL STATUS EXAM (MSE):    MSE FINDINGS ARE WITHIN NORMAL LIMITS (WNL) UNLESS OTHERWISE STATED BELOW. ( ALL OF THE BELOW CATEGORIES OF THE MSE HAVE BEEN REVIEWED (reviewed 4/27/2018) AND UPDATED AS DEEMED APPROPRIATE )  General Presentation age appropriate, cooperative   Orientation oriented to time, place and person   Vital Signs  See below (reviewed 4/27/2018); Vital Signs (BP, Pulse, & Temp) are within normal limits if not listed below.    Gait and Station Stable/steady, no ataxia   Musculoskeletal System No extrapyramidal symptoms (EPS); no abnormal muscular movements or Tardive Dyskinesia (TD); muscle strength and tone are within normal limits   Language No aphasia or dysarthria   Speech:  monotone   Thought Processes logical; normal rate of thoughts; fair abstract reasoning/computation   Thought Associations goal directed   Thought Content free of delusions   Suicidal Ideations none   Homicidal Ideations none   Mood:  stable    Affect:  mood-congruent   Memory recent  fair   Memory remote:  fair   Concentration/Attention:  distractable   Fund of Knowledge below average   Insight:  fair   Reliability poor   Judgment:  fair          VITALS:     Patient Vitals for the past 24 hrs:   Temp Pulse Resp BP SpO2   04/27/18 0736 98.3 °F (36.8 °C) 74 16 129/87 98 %   04/26/18 1918 98.6 °F (37 °C) 80 18 97/73 97 %   04/26/18 1510 97.5 °F (36.4 °C) 71 18 113/79 100 %   04/26/18 1440 98.8 °F (37.1 °C) 81 16 125/84 98 %   04/26/18 1347 97.9 °F (36.6 °C) 75 16 109/76 99 %     Wt Readings from Last 3 Encounters:   04/26/18 72.6 kg (160 lb)   09/11/13 77.1 kg (170 lb)   05/19/13 79.4 kg (175 lb)     Temp Readings from Last 3 Encounters:   04/27/18 98.3 °F (36.8 °C)   09/11/13 98 °F (36.7 °C)   05/20/13 95.7 °F (35.4 °C)     BP Readings from Last 3 Encounters:   04/27/18 129/87   09/11/13 120/81   05/20/13 (!) 144/91     Pulse Readings from Last 3 Encounters:   04/27/18 74   09/11/13 68   05/20/13 (!) 56            DATA     LABORATORY DATA:  Labs Reviewed   CBC WITH AUTOMATED DIFF - Abnormal; Notable for the following:        Result Value    .5 (*)     All other components within normal limits   DRUG SCREEN, URINE - Abnormal; Notable for the following:     THC (TH-CANNABINOL) POSITIVE (*)     All other components within normal limits   ACETAMINOPHEN - Abnormal; Notable for the following:     Acetaminophen level <2 (*)     All other components within normal limits   URINE CULTURE HOLD SAMPLE   METABOLIC PANEL, COMPREHENSIVE   URINALYSIS W/MICROSCOPIC   SALICYLATE   ETHYL ALCOHOL   TSH 3RD GENERATION     Admission on 04/26/2018   Component Date Value Ref Range Status    WBC 04/26/2018 6.6  4.1 - 11.1 K/uL Final    RBC 04/26/2018 4.38  4.10 - 5.70 M/uL Final    HGB 04/26/2018 14.7  12.1 - 17.0 g/dL Final    HCT 04/26/2018 44.0  36.6 - 50.3 % Final    MCV 04/26/2018 100.5* 80.0 - 99.0 FL Final    MCH 04/26/2018 33.6  26.0 - 34.0 PG Final    MCHC 04/26/2018 33.4  30.0 - 36.5 g/dL Final    RDW 04/26/2018 12.9  11.5 - 14.5 % Final    PLATELET 95/05/3689 948  150 - 400 K/uL Final    MPV 04/26/2018 10.8  8.9 - 12.9 FL Final    NRBC 04/26/2018 0.0  0  WBC Final    ABSOLUTE NRBC 04/26/2018 0.00  0.00 - 0.01 K/uL Final    NEUTROPHILS 04/26/2018 71  32 - 75 % Final    LYMPHOCYTES 04/26/2018 14  12 - 49 % Final    MONOCYTES 04/26/2018 13  5 - 13 % Final    EOSINOPHILS 04/26/2018 1  0 - 7 % Final    BASOPHILS 04/26/2018 1  0 - 1 % Final    IMMATURE GRANULOCYTES 04/26/2018 0  0.0 - 0.5 % Final    ABS. NEUTROPHILS 04/26/2018 4.7  1.8 - 8.0 K/UL Final    ABS. LYMPHOCYTES 04/26/2018 0.9  0.8 - 3.5 K/UL Final    ABS. MONOCYTES 04/26/2018 0.9  0.0 - 1.0 K/UL Final    ABS. EOSINOPHILS 04/26/2018 0.1  0.0 - 0.4 K/UL Final    ABS. BASOPHILS 04/26/2018 0.0  0.0 - 0.1 K/UL Final    ABS. IMM. GRANS. 04/26/2018 0.0  0.00 - 0.04 K/UL Final    DF 04/26/2018 AUTOMATED    Final    Sodium 04/26/2018 140  136 - 145 mmol/L Final    Potassium 04/26/2018 3.8  3.5 - 5.1 mmol/L Final    Chloride 04/26/2018 108  97 - 108 mmol/L Final    CO2 04/26/2018 23  21 - 32 mmol/L Final    Anion gap 04/26/2018 9  5 - 15 mmol/L Final    Glucose 04/26/2018 100  65 - 100 mg/dL Final    BUN 04/26/2018 12  6 - 20 MG/DL Final    Creatinine 04/26/2018 0.99  0.70 - 1.30 MG/DL Final    BUN/Creatinine ratio 04/26/2018 12  12 - 20   Final    GFR est AA 04/26/2018 >60  >60 ml/min/1.73m2 Final    GFR est non-AA 04/26/2018 >60  >60 ml/min/1.73m2 Final    Calcium 04/26/2018 9.5  8.5 - 10.1 MG/DL Final    Bilirubin, total 04/26/2018 0.5  0.2 - 1.0 MG/DL Final    ALT (SGPT) 04/26/2018 28  12 - 78 U/L Final    AST (SGOT) 04/26/2018 20  15 - 37 U/L Final    Alk.  phosphatase 04/26/2018 90  45 - 117 U/L Final    Protein, total 04/26/2018 8.2  6.4 - 8.2 g/dL Final    Albumin 04/26/2018 4.2  3.5 - 5.0 g/dL Final    Globulin 04/26/2018 4.0  2.0 - 4.0 g/dL Final    A-G Ratio 04/26/2018 1.1  1.1 - 2.2   Final    Color 04/26/2018 YELLOW/STRAW    Final    Appearance 04/26/2018 CLEAR  CLEAR   Final    Specific gravity 04/26/2018 1.008  1.003 - 1.030   Final    pH (UA) 04/26/2018 6.0  5.0 - 8.0   Final    Protein 04/26/2018 NEGATIVE   NEG mg/dL Final    Glucose 04/26/2018 NEGATIVE   NEG mg/dL Final  Ketone 04/26/2018 NEGATIVE   NEG mg/dL Final    Bilirubin 04/26/2018 NEGATIVE   NEG   Final    Blood 04/26/2018 NEGATIVE   NEG   Final    Urobilinogen 04/26/2018 1.0  0.2 - 1.0 EU/dL Final    Nitrites 04/26/2018 NEGATIVE   NEG   Final    Leukocyte Esterase 04/26/2018 NEGATIVE   NEG   Final    WBC 04/26/2018 0-4  0 - 4 /hpf Final    RBC 04/26/2018 0-5  0 - 5 /hpf Final    Epithelial cells 04/26/2018 FEW  FEW /lpf Final    Bacteria 04/26/2018 NEGATIVE   NEG /hpf Final    Urine culture hold 04/26/2018 URINE ON HOLD IN MICROBIOLOGY DEPT FOR 3 DAYS. IF UNPRESERVED URINE IS SUBMITTED, IT CANNOT BE USED FOR ADDITIONAL TESTING AFTER 24 HRS, RECOLLECTION WILL BE REQUIRED. Final    AMPHETAMINES 04/26/2018 NEGATIVE   NEG   Final    BARBITURATES 04/26/2018 NEGATIVE   NEG   Final    BENZODIAZEPINES 04/26/2018 NEGATIVE   NEG   Final    COCAINE 04/26/2018 NEGATIVE   NEG   Final    METHADONE 04/26/2018 NEGATIVE   NEG   Final    OPIATES 04/26/2018 NEGATIVE   NEG   Final    PCP(PHENCYCLIDINE) 04/26/2018 NEGATIVE   NEG   Final    THC (TH-CANNABINOL) 04/26/2018 POSITIVE* NEG   Final    Drug screen comment 04/26/2018 (NOTE)   Final    Acetaminophen level 04/26/2018 <2* 10 - 30 ug/mL Final    Salicylate level 75/10/5746 2.9  2.8 - 20.0 MG/DL Final    ALCOHOL(ETHYL),SERUM 04/26/2018 <10  <10 MG/DL Final    TSH 04/26/2018 1.06  0.36 - 3.74 uIU/mL Final        RADIOLOGY REPORTS:  No results found for this or any previous visit. No results found.            MEDICATIONS       ALL MEDICATIONS  Current Facility-Administered Medications   Medication Dose Route Frequency    OLANZapine (ZyPREXA) tablet 2.5 mg  2.5 mg Oral Q6H PRN    ziprasidone (GEODON) 10 mg in sterile water (preservative free) 0.5 mL injection  10 mg IntraMUSCular BID PRN    benztropine (COGENTIN) tablet 1 mg  1 mg Oral BID PRN    benztropine (COGENTIN) injection 1 mg  1 mg IntraMUSCular BID PRN    zolpidem (AMBIEN) tablet 5 mg  5 mg Oral QHS PRN  acetaminophen (TYLENOL) tablet 650 mg  650 mg Oral Q4H PRN    ibuprofen (MOTRIN) tablet 400 mg  400 mg Oral Q8H PRN    magnesium hydroxide (MILK OF MAGNESIA) 400 mg/5 mL oral suspension 30 mL  30 mL Oral DAILY PRN    nicotine (NICODERM CQ) 21 mg/24 hr patch 1 Patch  1 Patch TransDERmal DAILY PRN      SCHEDULED MEDICATIONS  Current Facility-Administered Medications   Medication Dose Route Frequency                ASSESSMENT & PLAN        The patient, Leena Yun, is a 79 y.o.  male who presents at this time for treatment of the following diagnoses:  Patient Active Hospital Problem List:   Substance induced mood disorder ([facility] [address] (4/26/2018)    Assessment: daily use with tolerance and mood symptoms     Plan: detox and consider antidepressant                   A coordinated, multidisplinary treatment team (includes the nurse, unit pharmcist,  and writer) round was conducted for this initial evaluation with the patient present. The following regarding medications was addressed during rounds with patient: none  the risks and benefits of the proposed medication. The patient was given the opportunity to ask questions. Informed consent given to the use of the above medications. I will continue to adjust psychiatric and non-psychiatric medications (see above \"medication\" section and orders section for details) as deemed appropriate & based upon diagnoses and response to treatment. I have reviewed admission (and previous/old) labs and medical tests in the EHR and or transferring hospital documents. I will continue to order blood tests/labs and diagnostic tests as deemed appropriate and review results as they become available (see orders for details). I have reviewed old psychiatric and medical records available in the EHR. I Will order additional psychiatric records from other institutions to further elucidate the nature of patient's psychopathology and review once available.     I will gather additional collateral information from friends, family and o/p treatment team to further elucidate the nature of patient's psychopathology and baselline level of psychiatric functioning.       ESTIMATED LENGTH OF STAY:    1 day        STRENGTHS:  Exercising self-direction/Resourceful, Access to housing/residential stability and Interpersonal/supportive relationships (family, friends, peers)                                        SIGNED:    Teodoro Silva MD  4/27/2018

## 2018-04-27 NOTE — BH NOTES
DISCHARGE SUMMARY    NAME:Juan Piedra  : 1950  MRN: [id number]    The patient Lamine Rogers exhibits the ability to control behavior in a less restrictive environment. Patient's level of functioning is improving. No assaultive/destructive behavior has been observed for the past 24 hours. No suicidal/homicidal threat or behavior has been observed for the past 24 hours. There is no evidence of serious medication side effects. Patient has not been in physical or protective restraints for at least the past 24 hours. If weapons involved, how are they secured? n/a    Is patient aware of and in agreement with discharge plan? yes    Arrangements for medication:  Prescriptions were not given  to patient. Referral for substance abuse treatment ? Yes, [facility]     Referral for smoking cessation needed ? no    Copy of discharge instructions to  provider?:  Fax: [phone] for transportation home:  [facility] as scheduled, continue to take prescribed medications per physician instructions.   Mental health crisis number:  [phone] or your local mental health crisis line number at [phone]

## 2018-04-27 NOTE — DISCHARGE INSTRUCTIONS
DISCHARGE SUMMARY    NAME:Juan Toledo  : 1950  MRN: [id number]    The patient Carolyn Keita exhibits the ability to control behavior in a less restrictive environment. Patient's level of functioning is improving. No assaultive/destructive behavior has been observed for the past 24 hours. No suicidal/homicidal threat or behavior has been observed for the past 24 hours. There is no evidence of serious medication side effects. Patient has not been in physical or protective restraints for at least the past 24 hours. If weapons involved, how are they secured? n/a    Is patient aware of and in agreement with discharge plan? yes    Arrangements for medication:  Prescriptions were not given  to patient. [facility] ? [facility]     Referral for smoking cessation needed ? no    Copy of discharge instructions to  provider?:  Fax: [phone] for transportation home:  Hospital provided transportation via [employer] all follow up appointments as scheduled, continue to take prescribed medications per physician instructions. Mental health crisis number:  [phone] or your local mental health crisis line number at [address] from Nurse    PATIENT INSTRUCTIONS:    What to do at Home:  Recommended activity: Activity as tolerated,     If you experience any of the following symptoms thoughts of harming self, feeling overwhelmed with grief, please follow up with assigned providers and local crisis number. *  Please give a list of your current medications to your Primary Care Provider. *  Please update this list whenever your medications are discontinued, doses are      changed, or new medications (including over-the-counter products) are added. *  Please carry medication information at all times in case of emergency situations.     These are general instructions for a healthy lifestyle:    No smoking/ No tobacco products/ Avoid exposure to second hand smoke  Surgeon General's Warning:  Quitting smoking now greatly reduces serious risk to your health. Obesity, smoking, and sedentary lifestyle greatly increases your risk for illness    A healthy diet, regular physical exercise & weight monitoring are important for maintaining a healthy lifestyle    You may be retaining fluid if you have a history of heart failure or if you experience any of the following symptoms:  Weight gain of 3 pounds or more overnight or 5 pounds in a week, increased swelling in our hands or feet or shortness of breath while lying flat in bed. Please call your doctor as soon as you notice any of these symptoms; do not wait until your next office visit. Recognize signs and symptoms of STROKE:    F-face looks uneven    A-arms unable to move or move unevenly    S-speech slurred or non-existent    T-time-call 911 as soon as signs and symptoms begin-DO NOT go       Back to bed or wait to see if you get better-TIME IS BRAIN. Warning Signs of HEART ATTACK     Call 911 if you have these symptoms:   Chest discomfort. Most heart attacks involve discomfort in the center of the chest that lasts more than a few minutes, or that goes away and comes back. It can feel like uncomfortable pressure, squeezing, fullness, or pain.  Discomfort in other areas of the upper body. Symptoms can include pain or discomfort in one or both arms, the back, neck, jaw, or stomach.  Shortness of breath with or without chest discomfort.  Other signs may include breaking out in a cold sweat, nausea, or lightheadedness. Don't wait more than five minutes to call 911 - MINUTES MATTER! Fast action can save your life. Calling 911 is almost always the fastest way to get lifesaving treatment. Emergency Medical Services staff can begin treatment when they arrive -- up to an hour sooner than if someone gets to the hospital by car.      The discharge information has been reviewed with the patient. The patient verbalized understanding. Discharge medications reviewed with the patient and appropriate educational materials and side effects teaching were provided.   ___________________________________________________________________________________________________________________________________

## 2018-04-27 NOTE — BH NOTES
PRN Medication Documentation    Specific patient behavior that led to need for PRN medication: promotion of rest  Staff interventions attempted prior to PRN being given: lights dim, milieu quiet  PRN medication given: Ambien 5 mg po  Patient response/effectiveness of PRN medication: continue to monitor.

## 2018-04-27 NOTE — INTERDISCIPLINARY ROUNDS
Behavioral Health Interdisciplinary Rounds     Patient Name: Carolyn Keita  Age: 79 y.o.   Room/Bed:  72/  Primary Diagnosis: <principal problem not specified>   Admission Status: Voluntary     Readmission within 30 days: no  Power of  in place: no  Patient requires a blocked bed: no          Reason for blocked bed: n/a    VTE Prophylaxis: Not indicated    Mobility needs/Fall risk: no    Nutritional Plan: no  Consults: no         Labs/Testing due today?: no    Sleep hours: 7       Participation in Care/Groups:  no  Medication Compliant?: No Scheduled meds  PRNS (last 24 hours): Sleep Aid    Restraints (last 24 hours):  no     CIWA (range last 24 hours):     COWS (range last 24 hours):      Alcohol screening (AUDIT) completed -   AUDIT Score: 0     If applicable, date SBIRT discussed in treatment team AND documented:     Tobacco - patient is a smoker: Have You Used Tobacco in the Past 30 Days: No  Illegal Drugs use: Have You Used Any Illegal Substances Over the Past 12 Months: Yes    24 hour chart check complete: yes     Patient goal(s) for today:   Treatment team focus/goals:   Progress note     LOS:  1  Expected LOS:     Financial concerns/prescription coverage:    Date of last family contact:       Family requesting physician contact today:    Discharge plan:   Guns in the home:         Outpatient provider(s):     Participating treatment team members: Carolyn Keita, * (assigned SW),

## 2018-04-27 NOTE — BH NOTES
Behavioral Health Transition Record to Provider    Patient Name: Lamine Rogers  YOB: 1950  Medical Record Number: [id number]  Date of Admission: 4/26/2018  Date of Discharge: 4/27/2018    Attending Provider: Jordan Chacon MD  Discharging Provider: Agus Polanco. Duncan  To contact this individual call [phone] and ask the  to page. If unavailable, ask to be transferred to [facility] Provider on call. [facility] Provider will be available on call 24/7 and during holidays. Primary Care Provider: PROVIDER UNKNOWN    No Known Allergies    Reason for Admission: Pt was admitted for depression with SI but without intent and poor self care    Admission Diagnosis: Substance induced mood disorder ([facility] [address]    * No surgery found *    Results for orders placed or performed during the hospital encounter of 04/26/18   URINE CULTURE HOLD SAMPLE   Result Value Ref Range    Urine culture hold        URINE ON HOLD IN MICROBIOLOGY DEPT FOR 3 DAYS. IF UNPRESERVED URINE IS SUBMITTED, IT CANNOT BE USED FOR ADDITIONAL TESTING AFTER 24 HRS, RECOLLECTION WILL BE REQUIRED. CBC WITH AUTOMATED DIFF   Result Value Ref Range    WBC 6.6 4.1 - 11.1 K/uL    RBC 4.38 4.10 - 5.70 M/uL    HGB 14.7 12.1 - 17.0 g/dL    HCT 44.0 36.6 - 50.3 %    .5 (H) 80.0 - 99.0 FL    MCH 33.6 26.0 - 34.0 PG    MCHC 33.4 30.0 - 36.5 g/dL    RDW 12.9 11.5 - 14.5 %    PLATELET 288 893 - 946 K/uL    MPV 10.8 8.9 - 12.9 FL    NRBC 0.0 0  WBC    ABSOLUTE NRBC 0.00 0.00 - 0.01 K/uL    NEUTROPHILS 71 32 - 75 %    LYMPHOCYTES 14 12 - 49 %    MONOCYTES 13 5 - 13 %    EOSINOPHILS 1 0 - 7 %    BASOPHILS 1 0 - 1 %    IMMATURE GRANULOCYTES 0 0.0 - 0.5 %    ABS. NEUTROPHILS 4.7 1.8 - 8.0 K/UL    ABS. LYMPHOCYTES 0.9 0.8 - 3.5 K/UL    ABS. MONOCYTES 0.9 0.0 - 1.0 K/UL    ABS. EOSINOPHILS 0.1 0.0 - 0.4 K/UL    ABS. BASOPHILS 0.0 0.0 - 0.1 K/UL    ABS. IMM.  GRANS. 0.0 0.00 - 0.04 K/UL    DF AUTOMATED     METABOLIC PANEL, COMPREHENSIVE   Result Value Ref Range    Sodium 140 136 - 145 mmol/L    Potassium 3.8 3.5 - 5.1 mmol/L    Chloride 108 97 - 108 mmol/L    CO2 23 21 - 32 mmol/L    Anion gap 9 5 - 15 mmol/L    Glucose 100 65 - 100 mg/dL    BUN 12 6 - 20 MG/DL    Creatinine 0.99 0.70 - 1.30 MG/DL    BUN/Creatinine ratio 12 12 - 20      GFR est AA >60 >60 ml/min/1.73m2    GFR est non-AA >60 >60 ml/min/1.73m2    Calcium 9.5 8.5 - 10.1 MG/DL    Bilirubin, total 0.5 0.2 - 1.0 MG/DL    ALT (SGPT) 28 12 - 78 U/L    AST (SGOT) 20 15 - 37 U/L    Alk. phosphatase 90 45 - 117 U/L    Protein, total 8.2 6.4 - 8.2 g/dL    Albumin 4.2 3.5 - 5.0 g/dL    Globulin 4.0 2.0 - 4.0 g/dL    A-G Ratio 1.1 1.1 - 2.2     URINALYSIS W/MICROSCOPIC   Result Value Ref Range    Color YELLOW/STRAW      Appearance CLEAR CLEAR      Specific gravity 1.008 1.003 - 1.030      pH (UA) 6.0 5.0 - 8.0      Protein NEGATIVE  NEG mg/dL    Glucose NEGATIVE  NEG mg/dL    Ketone NEGATIVE  NEG mg/dL    Bilirubin NEGATIVE  NEG      Blood NEGATIVE  NEG      Urobilinogen 1.0 0.2 - 1.0 EU/dL    Nitrites NEGATIVE  NEG      Leukocyte Esterase NEGATIVE  NEG      WBC 0-4 0 - 4 /hpf    RBC 0-5 0 - 5 /hpf    Epithelial cells FEW FEW /lpf    Bacteria NEGATIVE  NEG /hpf   DRUG SCREEN, URINE   Result Value Ref Range    AMPHETAMINES NEGATIVE  NEG      BARBITURATES NEGATIVE  NEG      BENZODIAZEPINES NEGATIVE  NEG      COCAINE NEGATIVE  NEG      METHADONE NEGATIVE  NEG      OPIATES NEGATIVE  NEG      PCP(PHENCYCLIDINE) NEGATIVE  NEG      THC (TH-CANNABINOL) POSITIVE (A) NEG      Drug screen comment (NOTE)    ACETAMINOPHEN   Result Value Ref Range    Acetaminophen level <2 (L) 10 - 30 ug/mL   SALICYLATE   Result Value Ref Range    Salicylate level 2.9 2.8 - 20.0 MG/DL   ETHYL ALCOHOL   Result Value Ref Range    ALCOHOL(ETHYL),SERUM <10 <10 MG/DL   TSH 3RD GENERATION   Result Value Ref Range    TSH 1.06 0.36 - 3.74 uIU/mL       Immunizations administered during this encounter:    There is no immunization history on file for this patient. Screening for Metabolic Disorders for Patients on Antipsychotic Medications  (Data obtained from the EMR)    Estimated Body Mass Index  Estimated body mass index is 22.32 kg/(m^2) as calculated from the following:    Height as of this encounter: 5' 11\" (1.803 m). Weight as of this encounter: 72.6 kg (160 lb). Vital Signs/Blood Pressure  Visit Vitals    /87 (BP Patient Position: Sitting)    Pulse 74    Temp 98.3 °F (36.8 °C)    Resp 16    Ht 5' 11\" (1.803 m)    Wt 72.6 kg (160 lb)    SpO2 98%    BMI 22.32 kg/m2       Blood Glucose/Hemoglobin A1c  Lab Results   Component Value Date/Time    Glucose 100 2018 10:39 AM       No results found for: HBA1C, HGBE8, RCB9SQKC     Lipid Panel  No results found for: CHOL, CHOLX, CHLST, CHOLV, 033413, HDL, LDL, LDLC, DLDLP, TGLX, TRIGL, TRIGP, CHHD, CHHDX     Discharge Diagnosis:  Substance Induced Mood Disorder    Discharge Plan:  Jesse Milan  : 1950  MRN: [id number]    The patient Byron Garza exhibits the ability to control behavior in a less restrictive environment. Patient's level of functioning is improving. No assaultive/destructive behavior has been observed for the past 24 hours. No suicidal/homicidal threat or behavior has been observed for the past 24 hours. There is no evidence of serious medication side effects. Patient has not been in physical or protective restraints for at least the past 24 hours. If weapons involved, how are they secured? n/a    Is patient aware of and in agreement with discharge plan? yes    Arrangements for medication:  Prescriptions were not given  to patient. Referral for substance abuse treatment ?  [facility]     Referral for smoking cessation needed ? no    Copy of discharge instructions to  provider?:  Fax: [phone]    Arrangements for transportation home:  [facility]    Keep all follow up appointments as scheduled, continue to take prescribed medications per physician instructions. Mental health crisis number:  [phone] or your local mental health crisis line number at [phone]    Discharge Medication List and Instructions: There are no discharge medications for this patient. Unresulted Labs     None        To obtain results of studies pending at discharge, please contact [phone]    Follow-up Information     Follow up With Details Comments Contact Info    Provider Unknown   Patient not available to ask      Dr Edouard Crowell  On 5/4/2018 Time of Your Appt: 11:30 am [address] [employer] [phone]          Advanced Directive:   Does the patient have an appointed surrogate decision maker? No  Does the patient have a Medical Advance Directive? No  Does the patient have a Psychiatric Advance Directive? No  If the patient does not have a surrogate or Medical Advance Directive AND Psychiatric Advance Directive, the patient was offered information on these advance directives   Pt declined. Patient Instructions: Please continue all medications until otherwise directed by physician. Tobacco Cessation Discharge Plan:   Is the patient a smoker and needs referral for smoking cessation? No  Patient referred to the following for smoking cessation with an appointment? No  Patient was offered medication to assist with smoking cessation at discharge? No  Was education for smoking cessation added to the discharge instructions? N/A    Alcohol/Substance Abuse Discharge Plan:   Does the patient have a history of substance/alcohol abuse and requires a referral for treatment? Yes  Patient referred to the following for substance/alcohol abuse treatment with an appointment[facility] @11:30 am  Patient was offered medication to assist with alcohol cessation at discharge?  No  Was education for substance/alcohol abuse added to discharge instructions? Yes    Patient discharged to Home; provided to the patient/caregiver either in hard copy or electronically.
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
## 2018-04-27 NOTE — PROGRESS NOTES
Pharmacist Discharge Medication Reconciliation    Discharging Provider: Dr. Stacy Ashford    Significant PMH:   Past Medical History:   Diagnosis Date    Aggressive outburst     Liver disease     Psychiatric disorder     ptsd    Substance abuse     Withdrawal syndrome ([facility] [address]      Chief Complaint for this Admission:   Chief Complaint   Patient presents with    Mental Health Problem     Allergies: Review of patient's allergies indicates no known allergies. Discharge Medications: There are no discharge medications for this patient.       The patient's chart, MAR and AVS were reviewed by Bridget Ellis PHARMD.

## 2018-04-27 NOTE — PROGRESS NOTES
[facility] [address]  Master Treatment Plan for Florencio Michael    Date Treatment Plan Initiated: 4/27/18    Treatment Plan Modalities:  Type of Modality Amount  (x minutes) Frequency (x/week) Duration (x days) Name of Responsible Staff   [address] [facility] meetings to encourage peer interactions 15 7 1 Lucy SHIRIN     Group psychotherapy to assist in building coping skills and internal controls 60 7 1 Yaya Loredo   Therapeutic activity groups to build coping skills 60 7 1 Yaya Loredo   Psychoeducation in group setting to address:   Medication education   15 [address] skills         Relaxation techniques         Symptom management         Discharge planning   [address]   60 1 1 volunteer   Recovery/AA/NA      volunteer   Physician medication management   13 7 1 Dr. Niles Araujo meeting/discharge planning   15 2 [facility]                                        Problem: Depressed Mood (Adult/Pediatric) these goals will be met by 4/27/18  Goal: *STG: Participates in treatment plan  Outcome: Progressing Towards Goal  Out on unit social w peers and staff. Mood and affect bright and euthymic, denies SI, no self harming behaviors. Demonstrates appropriate behaviors, ability to follow staff direction and unit routine. Daily goal is to d/c home.    Goal: *STG: Attends activities and groups  Outcome: Progressing Towards Goal  engaged  Goal: Interventions  Outcome: Progressing Towards Goal  Staff focus is on d/c planning  Goal: *STG: DEMONSTRATES REDUCTION IN SYMPTOMS AND INCREASE IN INSIGHT INTO COPING SKILLS/FUTURE FOCUSED  Outcome: Progressing Towards Goal  Denies SI, no self harming behaviors symptoms such as hopelessness resolved

## 2018-05-14 ENCOUNTER — APPOINTMENT (OUTPATIENT)
Dept: GENERAL RADIOLOGY | Age: 68
End: 2018-05-14
Attending: PHYSICIAN ASSISTANT
Payer: MEDICARE

## 2018-05-14 ENCOUNTER — HOSPITAL ENCOUNTER (EMERGENCY)
Age: 68
Discharge: HOME | End: 2018-05-14
Attending: EMERGENCY MEDICINE
Payer: MEDICARE

## 2018-05-14 VITALS
SYSTOLIC BLOOD PRESSURE: 100 MMHG | HEIGHT: 71 IN | OXYGEN SATURATION: 98 % | WEIGHT: 161 LBS | DIASTOLIC BLOOD PRESSURE: 80 MMHG | HEART RATE: 90 BPM | RESPIRATION RATE: 16 BRPM | BODY MASS INDEX: 22.54 KG/M2 | TEMPERATURE: 98.5 F

## 2018-05-14 DIAGNOSIS — R07.9 CHEST PAIN, UNSPECIFIED TYPE: ICD-10-CM

## 2018-05-14 DIAGNOSIS — F32.A DEPRESSION, UNSPECIFIED DEPRESSION TYPE: Primary | ICD-10-CM

## 2018-05-14 LAB
ALBUMIN SERPL-MCNC: 3.4 G/DL (ref 3.5–5)
ALBUMIN/GLOB SERPL: 1 {RATIO} (ref 1.1–2.2)
ALP SERPL-CCNC: 75 U/L (ref 45–117)
ALT SERPL-CCNC: 35 U/L (ref 12–78)
AMPHET UR QL SCN: NEGATIVE
ANION GAP SERPL CALC-SCNC: 9 MMOL/L (ref 5–15)
APAP SERPL-MCNC: <2 UG/ML (ref 10–30)
APPEARANCE UR: ABNORMAL
AST SERPL-CCNC: 44 U/L (ref 15–37)
ATRIAL RATE: 83 BPM
BACTERIA URNS QL MICRO: NEGATIVE /HPF
BARBITURATES UR QL SCN: NEGATIVE
BASOPHILS # BLD: 0.1 K/UL (ref 0–0.1)
BASOPHILS NFR BLD: 1 % (ref 0–1)
BENZODIAZ UR QL: NEGATIVE
BILIRUB SERPL-MCNC: 0.3 MG/DL (ref 0.2–1)
BILIRUB UR QL CFM: NEGATIVE
BUN SERPL-MCNC: 10 MG/DL (ref 6–20)
BUN/CREAT SERPL: 12 (ref 12–20)
CALCIUM SERPL-MCNC: 8.7 MG/DL (ref 8.5–10.1)
CALCULATED P AXIS, ECG09: 72 DEGREES
CALCULATED R AXIS, ECG10: 84 DEGREES
CALCULATED T AXIS, ECG11: 78 DEGREES
CANNABINOIDS UR QL SCN: POSITIVE
CHLORIDE SERPL-SCNC: 106 MMOL/L (ref 97–108)
CO2 SERPL-SCNC: 25 MMOL/L (ref 21–32)
COCAINE UR QL SCN: NEGATIVE
COLOR UR: ABNORMAL
CREAT SERPL-MCNC: 0.85 MG/DL (ref 0.7–1.3)
DIAGNOSIS, 93000: NORMAL
DIFFERENTIAL METHOD BLD: ABNORMAL
DRUG SCRN COMMENT,DRGCM: ABNORMAL
EOSINOPHIL # BLD: 0.1 K/UL (ref 0–0.4)
EOSINOPHIL NFR BLD: 2 % (ref 0–7)
EPITH CASTS URNS QL MICRO: ABNORMAL /LPF
ERYTHROCYTE [DISTWIDTH] IN BLOOD BY AUTOMATED COUNT: 12.4 % (ref 11.5–14.5)
ETHANOL SERPL-MCNC: <10 MG/DL
GLOBULIN SER CALC-MCNC: 3.5 G/DL (ref 2–4)
GLUCOSE SERPL-MCNC: 104 MG/DL (ref 65–100)
GLUCOSE UR STRIP.AUTO-MCNC: NEGATIVE MG/DL
HCT VFR BLD AUTO: 35.8 % (ref 36.6–50.3)
HGB BLD-MCNC: 12.2 G/DL (ref 12.1–17)
HGB UR QL STRIP: NEGATIVE
IMM GRANULOCYTES # BLD: 0 K/UL (ref 0–0.04)
IMM GRANULOCYTES NFR BLD AUTO: 0 % (ref 0–0.5)
KETONES UR QL STRIP.AUTO: NEGATIVE MG/DL
LEUKOCYTE ESTERASE UR QL STRIP.AUTO: NEGATIVE
LYMPHOCYTES # BLD: 1.1 K/UL (ref 0.8–3.5)
LYMPHOCYTES NFR BLD: 21 % (ref 12–49)
MCH RBC QN AUTO: 33.6 PG (ref 26–34)
MCHC RBC AUTO-ENTMCNC: 34.1 G/DL (ref 30–36.5)
MCV RBC AUTO: 98.6 FL (ref 80–99)
METHADONE UR QL: NEGATIVE
MONOCYTES # BLD: 0.7 K/UL (ref 0–1)
MONOCYTES NFR BLD: 14 % (ref 5–13)
NEUTS SEG # BLD: 3.1 K/UL (ref 1.8–8)
NEUTS SEG NFR BLD: 62 % (ref 32–75)
NITRITE UR QL STRIP.AUTO: NEGATIVE
NRBC # BLD: 0 K/UL (ref 0–0.01)
NRBC BLD-RTO: 0 PER 100 WBC
OPIATES UR QL: NEGATIVE
P-R INTERVAL, ECG05: 162 MS
PCP UR QL: NEGATIVE
PH UR STRIP: 6 [PH] (ref 5–8)
PLATELET # BLD AUTO: 227 K/UL (ref 150–400)
PMV BLD AUTO: 10.7 FL (ref 8.9–12.9)
POTASSIUM SERPL-SCNC: 3.9 MMOL/L (ref 3.5–5.1)
PROT SERPL-MCNC: 6.9 G/DL (ref 6.4–8.2)
PROT UR STRIP-MCNC: ABNORMAL MG/DL
Q-T INTERVAL, ECG07: 364 MS
QRS DURATION, ECG06: 80 MS
QTC CALCULATION (BEZET), ECG08: 427 MS
RBC # BLD AUTO: 3.63 M/UL (ref 4.1–5.7)
RBC #/AREA URNS HPF: ABNORMAL /HPF (ref 0–5)
RBC MORPH BLD: ABNORMAL
SALICYLATES SERPL-MCNC: 3 MG/DL (ref 2.8–20)
SODIUM SERPL-SCNC: 140 MMOL/L (ref 136–145)
SP GR UR REFRACTOMETRY: 1.02 (ref 1–1.03)
TROPONIN I SERPL-MCNC: <0.04 NG/ML
TROPONIN I SERPL-MCNC: <0.04 NG/ML
UR CULT HOLD, URHOLD: NORMAL
UROBILINOGEN UR QL STRIP.AUTO: 1 EU/DL (ref 0.2–1)
VENTRICULAR RATE, ECG03: 83 BPM
WBC # BLD AUTO: 5.1 K/UL (ref 4.1–11.1)
WBC URNS QL MICRO: ABNORMAL /HPF (ref 0–4)

## 2018-05-14 PROCEDURE — 71046 X-RAY EXAM CHEST 2 VIEWS: CPT

## 2018-05-14 PROCEDURE — 85025 COMPLETE CBC W/AUTO DIFF WBC: CPT | Performed by: PHYSICIAN ASSISTANT

## 2018-05-14 PROCEDURE — 80307 DRUG TEST PRSMV CHEM ANLYZR: CPT | Performed by: PHYSICIAN ASSISTANT

## 2018-05-14 PROCEDURE — 36415 COLL VENOUS BLD VENIPUNCTURE: CPT | Performed by: PHYSICIAN ASSISTANT

## 2018-05-14 PROCEDURE — 93005 ELECTROCARDIOGRAM TRACING: CPT

## 2018-05-14 PROCEDURE — 80053 COMPREHEN METABOLIC PANEL: CPT | Performed by: PHYSICIAN ASSISTANT

## 2018-05-14 PROCEDURE — 81001 URINALYSIS AUTO W/SCOPE: CPT | Performed by: PHYSICIAN ASSISTANT

## 2018-05-14 PROCEDURE — 99283 EMERGENCY DEPT VISIT LOW MDM: CPT

## 2018-05-14 PROCEDURE — 84484 ASSAY OF TROPONIN QUANT: CPT | Performed by: PHYSICIAN ASSISTANT

## 2018-05-14 NOTE — ED PROVIDER NOTES
HPI Comments: 79 y.o. male with past medical history significant for substance abuse, liver disease, PTSD, schizophrenia, and depression who presents from home via EMS with chief complaint of mental health problem. The patient is a poor historian, responds \"I don't remember\" to most questions. He reports 1 week of increased depression and SI, reports a plan to OD on his medication. The patient reports he was on a medication for depression, but does not remember if he is still on it or not. He denies any pain, but when prompted about documented triage complaint of chest pain, the patient notes having intermittent left-sided chest pain this morning, with episodes lasting a few minutes at a time. He reports possibly having a \"mild\" MI in the past. He has no chest pain currently. He reports \"I am hungry as hell\" and expressing interest in being admitted to psych. He reports having diabetes from [place] [address] exposure. The patient denies any SOB, nausea, vomiting, fever, or abdominal pain. There are no other acute medical concerns at this time. Full history, physical exam, and ROS unable to be obtained due to:  confusion. Social hx: Former smoker; No EtOH use; Marijuana use     Note written by Violetta Ceballos, as dictated by Jake Triplett PA-C 2:43 PM    The history is provided by the patient. No  was used. Past Medical History:   Diagnosis Date    Aggressive outburst     Liver disease     Psychiatric disorder     ptsd    Psychiatric disorder     schizophrenia    Psychiatric disorder     depression    Substance abuse     Withdrawal syndrome ([facility] [address]        History reviewed. No pertinent surgical history. Family History:   Problem Relation Age of Onset    Substance Abuse Father        Social History     Social History    Marital status: SINGLE     Spouse name: N/A    Number of children: N/A    Years of education: N/A     Occupational History    Not on file. Social History Main Topics    Smoking status: Former Smoker    Smokeless tobacco: Never Used    Alcohol use No    Drug use: Yes     Special: Marijuana      Comment: a joint a week    Sexual activity: Yes     Other Topics Concern    Not on file     Social History Narrative    79year old male admitted for depression and substance abuse. Pt is followed at the [address]: Review of patient's allergies indicates no known allergies. Review of Systems   Constitutional: Negative for chills and fever. Respiratory: Negative for shortness of breath. Cardiovascular: Positive for chest pain. Gastrointestinal: Negative for abdominal pain, diarrhea, nausea and vomiting. Musculoskeletal: Negative for myalgias. Skin: Negative for wound. Neurological: Negative for syncope and light-headedness. Psychiatric/Behavioral: Positive for dysphoric mood and suicidal ideas. All other systems reviewed and are negative. Vitals:    05/14/18 1316   BP: 93/74   Pulse: 82   Resp: 16   Temp: 98.6 °F (37 °C)   SpO2: 98%   Weight: 73 kg (161 lb)   Height: 5' 11\" (1.803 m)            Physical Exam   Constitutional: He is oriented to person, place, and time. He appears well-developed and well-nourished. No distress. HENT:   Head: Normocephalic and atraumatic. Eyes: Conjunctivae and EOM are normal.   Neck: Normal range of motion. Neck supple. Cardiovascular: Normal rate, regular rhythm and normal heart sounds. Pulmonary/Chest: Effort normal and breath sounds normal.   Abdominal: Soft. He exhibits no distension. There is no tenderness. Musculoskeletal: Normal range of motion. Neurological: He is alert and oriented to person, place, and time. Skin: Skin is warm and dry. He is not diaphoretic. Psychiatric: His speech is not rapid and/or pressured. He is not agitated and not aggressive. Thought content is not delusional. He exhibits a depressed mood. He expresses suicidal ideation.  He expresses no homicidal ideation. He expresses suicidal plans. He exhibits abnormal recent memory and abnormal remote memory. Nursing note and vitals reviewed. MDM  Number of Diagnoses or Management Options  Chest pain, unspecified type:   Depression, unspecified depression type:      Amount and/or Complexity of Data Reviewed  Clinical lab tests: ordered and reviewed  Tests in the radiology section of CPT®: ordered and reviewed  Independent visualization of images, tracings, or specimens: yes (CXR)    Patient Progress  Patient progress: stable        ED Course       Procedures      79 y.o. male with past medical history significant for substance abuse, liver disease, PTSD, schizophrenia, and depression who presents from home via EMS with chief complaint of mental health problem. Chest pain concerning for ACS given history of possible previous MI. Will obtain EKG, CXR and serial troponins as well as psych panel labs. Will consult [facility] for psychiatric eval.    ED EKG interpretation:  Rhythm: normal sinus rhythm; and regular . Rate (approx.): 83; Axis: normal; ST/T wave: normal; no previous EKG for comparison. CXR, read by radiology and independently visualized and interpreted by myself, reveals no evidence of acute cardiopulmonary abnormalities. Troponin negative x2. Labs otherwise unremarkable. Patient is medically cleared. Progress Note - 2:51 PM  [facility] talking with the patient. Consult Note - 3:30 PM  [facility] has evaluated the patient - patient is now lucid with appropriate affect, able to remember his past medical and psychiatric history and denies and suicidal ideation or plan. [facility] recommends outpatient follow up with the patient's psychiatrist at the [facility]. Safe for discharge home. Patient was encouraged to follow up with his psychiatrist, and to also schedule follow up with cardiology for further evaluation of chest pain.  Strict ED return precautions discussed and provided in writing at time of discharge. The patient verbalized understanding and agreement with this plan.

## 2018-05-14 NOTE — [facility] NOTE
Met with this gentleman as he had requested talking to a counselor. Pt also reported complaint of chest pain which will be assessed. Pt states that he is a  and sees a psychiatrist that he likes very much-\"about every 2 months.'  Pt reported that he would like to talk to a counselor once or twice a month as he gets lonely. Pt was able to answer questions appropriately seemed overall pretty happy and content just talking for a few minutes. Pt denied any acute symptoms and did not present with any psychosis or delusional thinking. Pt was alert and oriented. Upon leaving the room this evaluator asked why he brought a suitcase and he seemed surprised that he was not going to be admitted \"upstairs. \"  Pt started crying and reported that \"I'm a human being and my place where I live is filthy and disgusting. \"  Pt is not living in an adult home or nursing care facility but rather his own apartment. Pt. States that he doesn't want to go home and he wants to stay here. Offered pt resources (shelters, casemanagement) however, pt is not interested in those services. Pt seemed determined that he would be admitted to this facility. Discussed with pt the role of the ER and services in the community. Pt will be discharged once medically cleared as he is not meeting any criteria for a psychiatric admission.   Ila Villa LCSW

## 2018-05-14 NOTE — DISCHARGE INSTRUCTIONS
Chest Pain: Care Instructions  Your Care Instructions    There are many things that can cause chest pain. Some are not serious and will get better on their own in a few days. But some kinds of chest pain need more testing and treatment. Your doctor may have recommended a follow-up visit in the next 8 to 12 hours. If you are not getting better, you may need more tests or treatment. Even though your doctor has released you, you still need to watch for any problems. The doctor carefully checked you, but sometimes problems can develop later. If you have new symptoms or if your symptoms do not get better, get medical care right away. If you have worse or different chest pain or pressure that lasts more than 5 minutes or you passed out (lost consciousness), call 911 or seek other emergency help right away. A medical visit is only one step in your treatment. Even if you feel better, you still need to do what your doctor recommends, such as going to all suggested follow-up appointments and taking medicines exactly as directed. This will help you recover and help prevent future problems. How can you care for yourself at home? · Rest until you feel better. · Take your medicine exactly as prescribed. Call your doctor if you think you are having a problem with your medicine. · Do not drive after taking a prescription pain medicine. When should you call for help? Call 911 if:  ? · You passed out (lost consciousness). ? · You have severe difficulty breathing. ? · You have symptoms of a heart attack. These may include:  ¨ Chest pain or pressure, or a strange feeling in your chest.  ¨ Sweating. ¨ Shortness of breath. ¨ Nausea or vomiting. ¨ Pain, pressure, or a strange feeling in your back, neck, jaw, or upper belly or in one or both shoulders or arms. ¨ Lightheadedness or sudden weakness. ¨ A fast or irregular heartbeat.   After you call 911, the  may tell you to chew 1 adult-strength or 2 to 4 low-dose aspirin. Wait for an ambulance. Do not try to drive yourself. ?Call your doctor today if:  ? · You have any trouble breathing. ? · Your chest pain gets worse. ? · You are dizzy or lightheaded, or you feel like you may faint. ? · You are not getting better as expected. ? · You are having new or different chest pain. Where can you learn more? Go to http://kim-fausto.info/. Enter A120 in the search box to learn more about \"Chest Pain: Care Instructions. \"  Current as of: March 20, 2017  Content Version: 11.4  © 5892-5399 [employer]. Care instructions adapted under license by [employer] (which disclaims liability or warranty for this information). If you have questions about a medical condition or this instruction, always ask your healthcare professional. [address] any warranty or liability for your use of this information.

## 2018-05-14 NOTE — ED NOTES
1:17 PM  I have evaluated the patient as the Provider in Triage. I have reviewed His vital signs and the triage nurse assessment. I have talked with the patient and any available family and advised that I am the provider in triage and have ordered the appropriate study to initiate their work up based on the clinical presentation during my assessment. I have advised that the patient will be accommodated in the Main ED as soon as possible. I have also requested to contact the triage nurse or myself immediately if the patient experiences any changes in their condition during this brief waiting period. CP and depression.     Dia Cruz, PA

## 2018-05-14 NOTE — ED TRIAGE NOTES
Pt arrives ambulatory with EMS for depression, denies SI and HI. Has not been taking his medications for the past few days.   Pt also complains of chest pain